# Patient Record
Sex: FEMALE | Race: WHITE
[De-identification: names, ages, dates, MRNs, and addresses within clinical notes are randomized per-mention and may not be internally consistent; named-entity substitution may affect disease eponyms.]

---

## 2018-07-17 ENCOUNTER — HOSPITAL ENCOUNTER (OUTPATIENT)
Dept: HOSPITAL 74 - JASU-ENDO | Age: 43
Discharge: HOME | End: 2018-07-17
Attending: SURGERY
Payer: COMMERCIAL

## 2018-07-17 VITALS — HEART RATE: 74 BPM | TEMPERATURE: 98.4 F | SYSTOLIC BLOOD PRESSURE: 137 MMHG | DIASTOLIC BLOOD PRESSURE: 68 MMHG

## 2018-07-17 VITALS — BODY MASS INDEX: 44.5 KG/M2

## 2018-07-17 DIAGNOSIS — E06.3: ICD-10-CM

## 2018-07-17 DIAGNOSIS — Z01.818: Primary | ICD-10-CM

## 2018-07-17 DIAGNOSIS — E66.01: ICD-10-CM

## 2018-07-17 PROCEDURE — 0DJ08ZZ INSPECTION OF UPPER INTESTINAL TRACT, VIA NATURAL OR ARTIFICIAL OPENING ENDOSCOPIC: ICD-10-PCS | Performed by: SURGERY

## 2018-09-11 NOTE — SPEC
DATE OF OPERATION:  07/17/2018

 

PREOPERATIVE DIAGNOSIS:  To evaluate anatomy prior to bariatric surgery.

 

POSTOPERATIVE DIAGNOSIS:  Gastric antral polyp.

 

PROCEDURE:  Upper endoscopy/EGD.

 

ESTIMATED BLOOD LOSS:  Zero mL.

 

DRAINS:  None.

 

ANESTHESIA:  MAC.

 

REASON FOR PROCEDURE:  The patient was undergoing evaluation for a vertical sleeve

gastrectomy.  In order to further evaluate anatomy, an upper endoscopy was performed.

 Risks and benefits of the procedure were explained.  These included bleeding,

infection, injury to surrounding structures including injury or perforation to the

esophagus, stomach, duodenum, abscess formation, injury to the oral cavity, MI, DVT,

PE, as some of the complications.  She understood and signed informed consent. 

 

DESCRIPTION OF PROCEDURE:  The patient was placed in the left lateral decubitus

position.  The endoscope was inserted into the patient's mouth after a timeout was

performed and inserted beyond the oral cavity.  The esophagus, GE junction, stomach

up to the level of the pylorus was inspected.  There was noted at the distal aspect

of the gastric antrum to be a hypertrophic polyp.  No biopsies at the time were

taken, and no other polyps were noted.  No other gross anatomical abnormalities were

noted.  The stomach was suctioned and the endoscope was fully removed.  She was

referred to her gastroenterologist for further evaluation and a possible biopsy of

the polyp prior to endoscopy.

 

 

MASSIEL HOPPER M.D.

 

VALENTE/[id number]

DD: 09/11/2018 16:55

DT: 09/11/2018 20:11

Job #:  [id number]